# Patient Record
Sex: FEMALE | Race: WHITE | HISPANIC OR LATINO | Employment: FULL TIME | URBAN - METROPOLITAN AREA
[De-identification: names, ages, dates, MRNs, and addresses within clinical notes are randomized per-mention and may not be internally consistent; named-entity substitution may affect disease eponyms.]

---

## 2024-03-25 ENCOUNTER — APPOINTMENT (OUTPATIENT)
Dept: GENERAL RADIOLOGY | Facility: CLINIC | Age: 44
DRG: 390 | End: 2024-03-25
Attending: EMERGENCY MEDICINE

## 2024-03-25 ENCOUNTER — APPOINTMENT (OUTPATIENT)
Dept: CT IMAGING | Facility: CLINIC | Age: 44
DRG: 390 | End: 2024-03-25
Attending: EMERGENCY MEDICINE

## 2024-03-25 ENCOUNTER — HOSPITAL ENCOUNTER (INPATIENT)
Facility: CLINIC | Age: 44
LOS: 1 days | Discharge: HOME | DRG: 390 | End: 2024-03-26
Attending: EMERGENCY MEDICINE | Admitting: INTERNAL MEDICINE

## 2024-03-25 DIAGNOSIS — M02.30 POST-STREPTOCOCCAL REACTIVE ARTHRITIS (H): ICD-10-CM

## 2024-03-25 DIAGNOSIS — E11.65 TYPE 2 DIABETES MELLITUS WITH HYPERGLYCEMIA, WITHOUT LONG-TERM CURRENT USE OF INSULIN (H): Primary | ICD-10-CM

## 2024-03-25 DIAGNOSIS — K56.609 SMALL BOWEL OBSTRUCTION (H): ICD-10-CM

## 2024-03-25 DIAGNOSIS — B94.8 POST-STREPTOCOCCAL REACTIVE ARTHRITIS (H): ICD-10-CM

## 2024-03-25 LAB
ALBUMIN SERPL BCG-MCNC: 3.8 G/DL (ref 3.5–5.2)
ALP SERPL-CCNC: 85 U/L (ref 40–150)
ALT SERPL W P-5'-P-CCNC: 123 U/L (ref 0–50)
ANION GAP SERPL CALCULATED.3IONS-SCNC: 12 MMOL/L (ref 7–15)
AST SERPL W P-5'-P-CCNC: 48 U/L (ref 0–45)
ATRIAL RATE - MUSE: 72 BPM
BASE EXCESS BLDV CALC-SCNC: -2 MMOL/L (ref -3–3)
BASE EXCESS BLDV CALC-SCNC: 0 MMOL/L (ref -3–3)
BASOPHILS # BLD AUTO: 0.1 10E3/UL (ref 0–0.2)
BASOPHILS NFR BLD AUTO: 0 %
BILIRUB SERPL-MCNC: 0.3 MG/DL
BUN SERPL-MCNC: 15.1 MG/DL (ref 6–20)
CALCIUM SERPL-MCNC: 8.6 MG/DL (ref 8.6–10)
CHLORIDE SERPL-SCNC: 103 MMOL/L (ref 98–107)
CREAT SERPL-MCNC: 0.38 MG/DL (ref 0.51–0.95)
DEPRECATED HCO3 PLAS-SCNC: 24 MMOL/L (ref 22–29)
DIASTOLIC BLOOD PRESSURE - MUSE: NORMAL MMHG
EGFRCR SERPLBLD CKD-EPI 2021: >90 ML/MIN/1.73M2
EOSINOPHIL # BLD AUTO: 0.2 10E3/UL (ref 0–0.7)
EOSINOPHIL NFR BLD AUTO: 1 %
ERYTHROCYTE [DISTWIDTH] IN BLOOD BY AUTOMATED COUNT: 12 % (ref 10–15)
GLUCOSE BLDC GLUCOMTR-MCNC: 127 MG/DL (ref 70–99)
GLUCOSE BLDC GLUCOMTR-MCNC: 127 MG/DL (ref 70–99)
GLUCOSE BLDC GLUCOMTR-MCNC: 129 MG/DL (ref 70–99)
GLUCOSE BLDC GLUCOMTR-MCNC: 214 MG/DL (ref 70–99)
GLUCOSE SERPL-MCNC: 262 MG/DL (ref 70–99)
HBA1C MFR BLD: 10.8 %
HCG SERPL QL: NEGATIVE
HCO3 BLDV-SCNC: 25 MMOL/L (ref 21–28)
HCO3 BLDV-SCNC: 26 MMOL/L (ref 21–28)
HCT VFR BLD AUTO: 44.4 % (ref 35–47)
HGB BLD-MCNC: 15.5 G/DL (ref 11.7–15.7)
IMM GRANULOCYTES # BLD: 0 10E3/UL
IMM GRANULOCYTES NFR BLD: 0 %
INTERPRETATION ECG - MUSE: NORMAL
LACTATE BLD-SCNC: 2.3 MMOL/L
LACTATE BLD-SCNC: 2.9 MMOL/L
LACTATE SERPL-SCNC: 2 MMOL/L (ref 0.7–2)
LIPASE SERPL-CCNC: 44 U/L (ref 13–60)
LYMPHOCYTES # BLD AUTO: 3.1 10E3/UL (ref 0.8–5.3)
LYMPHOCYTES NFR BLD AUTO: 26 %
MCH RBC QN AUTO: 31.1 PG (ref 26.5–33)
MCHC RBC AUTO-ENTMCNC: 34.9 G/DL (ref 31.5–36.5)
MCV RBC AUTO: 89 FL (ref 78–100)
MONOCYTES # BLD AUTO: 0.5 10E3/UL (ref 0–1.3)
MONOCYTES NFR BLD AUTO: 4 %
NEUTROPHILS # BLD AUTO: 8.2 10E3/UL (ref 1.6–8.3)
NEUTROPHILS NFR BLD AUTO: 69 %
NRBC # BLD AUTO: 0 10E3/UL
NRBC BLD AUTO-RTO: 0 /100
P AXIS - MUSE: 35 DEGREES
PCO2 BLDV: 47 MM HG (ref 40–50)
PCO2 BLDV: 48 MM HG (ref 40–50)
PH BLDV: 7.33 [PH] (ref 7.32–7.43)
PH BLDV: 7.34 [PH] (ref 7.32–7.43)
PLATELET # BLD AUTO: 238 10E3/UL (ref 150–450)
PO2 BLDV: 35 MM HG (ref 25–47)
PO2 BLDV: 54 MM HG (ref 25–47)
POTASSIUM SERPL-SCNC: 3.5 MMOL/L (ref 3.4–5.3)
PR INTERVAL - MUSE: 130 MS
PROT SERPL-MCNC: 6.6 G/DL (ref 6.4–8.3)
QRS DURATION - MUSE: 80 MS
QT - MUSE: 356 MS
QTC - MUSE: 389 MS
R AXIS - MUSE: 68 DEGREES
RBC # BLD AUTO: 4.98 10E6/UL (ref 3.8–5.2)
SAO2 % BLDV: 63 % (ref 70–75)
SAO2 % BLDV: 85 % (ref 70–75)
SODIUM SERPL-SCNC: 139 MMOL/L (ref 135–145)
SYSTOLIC BLOOD PRESSURE - MUSE: NORMAL MMHG
T AXIS - MUSE: 58 DEGREES
VENTRICULAR RATE- MUSE: 72 BPM
WBC # BLD AUTO: 12.1 10E3/UL (ref 4–11)

## 2024-03-25 PROCEDURE — 85025 COMPLETE CBC W/AUTO DIFF WBC: CPT | Performed by: EMERGENCY MEDICINE

## 2024-03-25 PROCEDURE — 250N000012 HC RX MED GY IP 250 OP 636 PS 637: Performed by: INTERNAL MEDICINE

## 2024-03-25 PROCEDURE — 74177 CT ABD & PELVIS W/CONTRAST: CPT

## 2024-03-25 PROCEDURE — 250N000009 HC RX 250: Performed by: EMERGENCY MEDICINE

## 2024-03-25 PROCEDURE — 99223 1ST HOSP IP/OBS HIGH 75: CPT | Performed by: INTERNAL MEDICINE

## 2024-03-25 PROCEDURE — 258N000003 HC RX IP 258 OP 636: Performed by: INTERNAL MEDICINE

## 2024-03-25 PROCEDURE — 250N000011 HC RX IP 250 OP 636: Performed by: EMERGENCY MEDICINE

## 2024-03-25 PROCEDURE — 80053 COMPREHEN METABOLIC PANEL: CPT | Performed by: EMERGENCY MEDICINE

## 2024-03-25 PROCEDURE — 84703 CHORIONIC GONADOTROPIN ASSAY: CPT | Performed by: EMERGENCY MEDICINE

## 2024-03-25 PROCEDURE — 93005 ELECTROCARDIOGRAM TRACING: CPT

## 2024-03-25 PROCEDURE — 250N000013 HC RX MED GY IP 250 OP 250 PS 637: Performed by: INTERNAL MEDICINE

## 2024-03-25 PROCEDURE — 83036 HEMOGLOBIN GLYCOSYLATED A1C: CPT | Performed by: INTERNAL MEDICINE

## 2024-03-25 PROCEDURE — 83690 ASSAY OF LIPASE: CPT | Performed by: EMERGENCY MEDICINE

## 2024-03-25 PROCEDURE — 96375 TX/PRO/DX INJ NEW DRUG ADDON: CPT

## 2024-03-25 PROCEDURE — 99207 PR APP CREDIT; MD BILLING SHARED VISIT: CPT | Performed by: INTERNAL MEDICINE

## 2024-03-25 PROCEDURE — 250N000011 HC RX IP 250 OP 636: Performed by: INTERNAL MEDICINE

## 2024-03-25 PROCEDURE — 96361 HYDRATE IV INFUSION ADD-ON: CPT

## 2024-03-25 PROCEDURE — 87040 BLOOD CULTURE FOR BACTERIA: CPT | Performed by: EMERGENCY MEDICINE

## 2024-03-25 PROCEDURE — 99285 EMERGENCY DEPT VISIT HI MDM: CPT | Mod: 25

## 2024-03-25 PROCEDURE — 258N000003 HC RX IP 258 OP 636: Performed by: EMERGENCY MEDICINE

## 2024-03-25 PROCEDURE — 999N000065 XR ABDOMEN 1 VIEW

## 2024-03-25 PROCEDURE — 83605 ASSAY OF LACTIC ACID: CPT | Performed by: INTERNAL MEDICINE

## 2024-03-25 PROCEDURE — 96374 THER/PROPH/DIAG INJ IV PUSH: CPT | Mod: 59

## 2024-03-25 PROCEDURE — 82803 BLOOD GASES ANY COMBINATION: CPT

## 2024-03-25 PROCEDURE — 36415 COLL VENOUS BLD VENIPUNCTURE: CPT | Performed by: INTERNAL MEDICINE

## 2024-03-25 PROCEDURE — 36415 COLL VENOUS BLD VENIPUNCTURE: CPT | Performed by: EMERGENCY MEDICINE

## 2024-03-25 PROCEDURE — 120N000001 HC R&B MED SURG/OB

## 2024-03-25 PROCEDURE — 99221 1ST HOSP IP/OBS SF/LOW 40: CPT | Performed by: SPECIALIST

## 2024-03-25 RX ORDER — OXYCODONE HYDROCHLORIDE 5 MG/1
5 TABLET ORAL EVERY 4 HOURS PRN
Status: DISCONTINUED | OUTPATIENT
Start: 2024-03-25 | End: 2024-03-26 | Stop reason: HOSPADM

## 2024-03-25 RX ORDER — PIPERACILLIN SODIUM, TAZOBACTAM SODIUM 4; .5 G/20ML; G/20ML
4.5 INJECTION, POWDER, LYOPHILIZED, FOR SOLUTION INTRAVENOUS ONCE
Status: COMPLETED | OUTPATIENT
Start: 2024-03-25 | End: 2024-03-25

## 2024-03-25 RX ORDER — ONDANSETRON 4 MG/1
4 TABLET, ORALLY DISINTEGRATING ORAL EVERY 6 HOURS PRN
Status: DISCONTINUED | OUTPATIENT
Start: 2024-03-25 | End: 2024-03-26 | Stop reason: HOSPADM

## 2024-03-25 RX ORDER — HYDROMORPHONE HCL IN WATER/PF 6 MG/30 ML
0.4 PATIENT CONTROLLED ANALGESIA SYRINGE INTRAVENOUS
Status: DISCONTINUED | OUTPATIENT
Start: 2024-03-25 | End: 2024-03-25

## 2024-03-25 RX ORDER — HYDROMORPHONE HYDROCHLORIDE 1 MG/ML
0.3 INJECTION, SOLUTION INTRAMUSCULAR; INTRAVENOUS; SUBCUTANEOUS
Status: DISCONTINUED | OUTPATIENT
Start: 2024-03-25 | End: 2024-03-26 | Stop reason: HOSPADM

## 2024-03-25 RX ORDER — NALOXONE HYDROCHLORIDE 0.4 MG/ML
0.4 INJECTION, SOLUTION INTRAMUSCULAR; INTRAVENOUS; SUBCUTANEOUS
Status: DISCONTINUED | OUTPATIENT
Start: 2024-03-25 | End: 2024-03-26 | Stop reason: HOSPADM

## 2024-03-25 RX ORDER — PROCHLORPERAZINE 25 MG
25 SUPPOSITORY, RECTAL RECTAL EVERY 12 HOURS PRN
Status: DISCONTINUED | OUTPATIENT
Start: 2024-03-25 | End: 2024-03-26 | Stop reason: HOSPADM

## 2024-03-25 RX ORDER — DEXTROSE MONOHYDRATE 25 G/50ML
25-50 INJECTION, SOLUTION INTRAVENOUS
Status: DISCONTINUED | OUTPATIENT
Start: 2024-03-25 | End: 2024-03-26 | Stop reason: HOSPADM

## 2024-03-25 RX ORDER — LIDOCAINE 40 MG/G
CREAM TOPICAL
Status: DISCONTINUED | OUTPATIENT
Start: 2024-03-25 | End: 2024-03-26 | Stop reason: HOSPADM

## 2024-03-25 RX ORDER — IOPAMIDOL 755 MG/ML
85 INJECTION, SOLUTION INTRAVASCULAR ONCE
Status: COMPLETED | OUTPATIENT
Start: 2024-03-25 | End: 2024-03-25

## 2024-03-25 RX ORDER — HYDROMORPHONE HCL IN WATER/PF 6 MG/30 ML
0.2 PATIENT CONTROLLED ANALGESIA SYRINGE INTRAVENOUS
Status: DISCONTINUED | OUTPATIENT
Start: 2024-03-25 | End: 2024-03-25

## 2024-03-25 RX ORDER — PROCHLORPERAZINE MALEATE 10 MG
10 TABLET ORAL EVERY 6 HOURS PRN
Status: DISCONTINUED | OUTPATIENT
Start: 2024-03-25 | End: 2024-03-26 | Stop reason: HOSPADM

## 2024-03-25 RX ORDER — KETOROLAC TROMETHAMINE 15 MG/ML
10 INJECTION, SOLUTION INTRAMUSCULAR; INTRAVENOUS ONCE
Status: COMPLETED | OUTPATIENT
Start: 2024-03-25 | End: 2024-03-25

## 2024-03-25 RX ORDER — ACETAMINOPHEN 650 MG/1
650 SUPPOSITORY RECTAL EVERY 4 HOURS PRN
Status: DISCONTINUED | OUTPATIENT
Start: 2024-03-25 | End: 2024-03-26 | Stop reason: HOSPADM

## 2024-03-25 RX ORDER — LANCETS
EACH MISCELLANEOUS
Qty: 100 EACH | Refills: 6 | Status: SHIPPED | OUTPATIENT
Start: 2024-03-25 | End: 2024-03-25

## 2024-03-25 RX ORDER — HYDRALAZINE HYDROCHLORIDE 10 MG/1
10 TABLET, FILM COATED ORAL EVERY 4 HOURS PRN
Status: DISCONTINUED | OUTPATIENT
Start: 2024-03-25 | End: 2024-03-26 | Stop reason: HOSPADM

## 2024-03-25 RX ORDER — ACETAMINOPHEN 325 MG/1
650 TABLET ORAL EVERY 4 HOURS PRN
Status: DISCONTINUED | OUTPATIENT
Start: 2024-03-25 | End: 2024-03-26 | Stop reason: HOSPADM

## 2024-03-25 RX ORDER — NICOTINE POLACRILEX 4 MG
15-30 LOZENGE BUCCAL
Status: DISCONTINUED | OUTPATIENT
Start: 2024-03-25 | End: 2024-03-26 | Stop reason: HOSPADM

## 2024-03-25 RX ORDER — NALOXONE HYDROCHLORIDE 0.4 MG/ML
0.2 INJECTION, SOLUTION INTRAMUSCULAR; INTRAVENOUS; SUBCUTANEOUS
Status: DISCONTINUED | OUTPATIENT
Start: 2024-03-25 | End: 2024-03-26 | Stop reason: HOSPADM

## 2024-03-25 RX ORDER — ONDANSETRON 2 MG/ML
4 INJECTION INTRAMUSCULAR; INTRAVENOUS EVERY 30 MIN PRN
Status: DISCONTINUED | OUTPATIENT
Start: 2024-03-25 | End: 2024-03-25

## 2024-03-25 RX ORDER — ONDANSETRON 2 MG/ML
4 INJECTION INTRAMUSCULAR; INTRAVENOUS EVERY 6 HOURS PRN
Status: DISCONTINUED | OUTPATIENT
Start: 2024-03-25 | End: 2024-03-26 | Stop reason: HOSPADM

## 2024-03-25 RX ORDER — HYDRALAZINE HYDROCHLORIDE 20 MG/ML
10 INJECTION INTRAMUSCULAR; INTRAVENOUS EVERY 4 HOURS PRN
Status: DISCONTINUED | OUTPATIENT
Start: 2024-03-25 | End: 2024-03-26 | Stop reason: HOSPADM

## 2024-03-25 RX ORDER — LIDOCAINE HYDROCHLORIDE 20 MG/ML
10 JELLY TOPICAL ONCE
Status: COMPLETED | OUTPATIENT
Start: 2024-03-25 | End: 2024-03-25

## 2024-03-25 RX ORDER — SODIUM CHLORIDE, SODIUM LACTATE, POTASSIUM CHLORIDE, CALCIUM CHLORIDE 600; 310; 30; 20 MG/100ML; MG/100ML; MG/100ML; MG/100ML
INJECTION, SOLUTION INTRAVENOUS CONTINUOUS
Status: DISCONTINUED | OUTPATIENT
Start: 2024-03-25 | End: 2024-03-25

## 2024-03-25 RX ORDER — MORPHINE SULFATE 4 MG/ML
4 INJECTION, SOLUTION INTRAMUSCULAR; INTRAVENOUS
Status: COMPLETED | OUTPATIENT
Start: 2024-03-25 | End: 2024-03-25

## 2024-03-25 RX ADMIN — INSULIN GLARGINE 10 UNITS: 100 INJECTION, SOLUTION SUBCUTANEOUS at 09:17

## 2024-03-25 RX ADMIN — Medication 1 ML: at 06:20

## 2024-03-25 RX ADMIN — SODIUM CHLORIDE, POTASSIUM CHLORIDE, SODIUM LACTATE AND CALCIUM CHLORIDE 1300 ML: 600; 310; 30; 20 INJECTION, SOLUTION INTRAVENOUS at 04:30

## 2024-03-25 RX ADMIN — KETOROLAC TROMETHAMINE 10 MG: 15 INJECTION, SOLUTION INTRAMUSCULAR; INTRAVENOUS at 01:51

## 2024-03-25 RX ADMIN — SODIUM CHLORIDE, POTASSIUM CHLORIDE, SODIUM LACTATE AND CALCIUM CHLORIDE: 600; 310; 30; 20 INJECTION, SOLUTION INTRAVENOUS at 06:54

## 2024-03-25 RX ADMIN — INSULIN ASPART 2 UNITS: 100 INJECTION, SOLUTION INTRAVENOUS; SUBCUTANEOUS at 09:17

## 2024-03-25 RX ADMIN — IOPAMIDOL 85 ML: 755 INJECTION, SOLUTION INTRAVENOUS at 03:16

## 2024-03-25 RX ADMIN — POTASSIUM CHLORIDE: 149 INJECTION, SOLUTION, CONCENTRATE INTRAVENOUS at 09:19

## 2024-03-25 RX ADMIN — POTASSIUM CHLORIDE: 149 INJECTION, SOLUTION, CONCENTRATE INTRAVENOUS at 18:25

## 2024-03-25 RX ADMIN — PIPERACILLIN AND TAZOBACTAM 4.5 G: 4; .5 INJECTION, POWDER, FOR SOLUTION INTRAVENOUS at 04:30

## 2024-03-25 RX ADMIN — SODIUM CHLORIDE 1000 ML: 9 INJECTION, SOLUTION INTRAVENOUS at 01:52

## 2024-03-25 RX ADMIN — MORPHINE SULFATE 4 MG: 4 INJECTION, SOLUTION INTRAMUSCULAR; INTRAVENOUS at 01:48

## 2024-03-25 RX ADMIN — SODIUM CHLORIDE 64 ML: 9 INJECTION, SOLUTION INTRAVENOUS at 03:16

## 2024-03-25 RX ADMIN — ONDANSETRON 4 MG: 2 INJECTION INTRAMUSCULAR; INTRAVENOUS at 01:52

## 2024-03-25 RX ADMIN — LIDOCAINE HYDROCHLORIDE 10 ML: 20 JELLY TOPICAL at 05:21

## 2024-03-25 RX ADMIN — TOPICAL ANESTHETIC 0.5 ML: 200 SPRAY DENTAL; PERIODONTAL at 05:21

## 2024-03-25 ASSESSMENT — ACTIVITIES OF DAILY LIVING (ADL)
ADLS_ACUITY_SCORE: 38
ADLS_ACUITY_SCORE: 38
ADLS_ACUITY_SCORE: 20
ADLS_ACUITY_SCORE: 20
ADLS_ACUITY_SCORE: 21
ADLS_ACUITY_SCORE: 35
ADLS_ACUITY_SCORE: 20
ADLS_ACUITY_SCORE: 35
ADLS_ACUITY_SCORE: 20
ADLS_ACUITY_SCORE: 35
ADLS_ACUITY_SCORE: 38
ADLS_ACUITY_SCORE: 38
ADLS_ACUITY_SCORE: 20
ADLS_ACUITY_SCORE: 35
ADLS_ACUITY_SCORE: 20
ADLS_ACUITY_SCORE: 20
ADLS_ACUITY_SCORE: 38
ADLS_ACUITY_SCORE: 35
ADLS_ACUITY_SCORE: 21
ADLS_ACUITY_SCORE: 21

## 2024-03-25 NOTE — DISCHARGE INSTRUCTIONS
"    Go Here for Primary Care - low cost / sliding scale options :)   [facility]  - \"[facility]\" is here for you!  *they will help keep costs low even if you do not have insurance; call to make appointment!     Address: [address] [place] [address]  Contact Us: [email or web address]      Main: [phone]  Dental: [phone]  [phone]  Fax: [phone]     Night and Weekend Nurse Line: [phone]   Pregnancy Call Line: [phone]              Open Monday - Friday, 8 a.m. to 5 p.m. (in-person, phone and video care available).  [facility] is a community clinic that welcomes patients of every age, race, color, ethnicity and language and has served the community for over 50 years.  They offer financial assistance and discount programs for uninsured persons.  [facility] can help you find resources for food, shelter, health care and prescription refills. COVID-19 testing is available with a pre-screening phone call.  [facility] provides medical, dental and mental health care, legal services, advocacy for domestic abuse and sexual assault, and much more--all in one place.    We speak your language! Language assistance services are available free of charge ensuring you receive the highest quality of care. Call [phone] TTY [phone].  Español - ATENCIÓN: si habla español, tiene a hoyos disposición servicios gratuitos de asistencia lingüística. Layla garrett [phone] TTY [phone].  Soomaali - FIIRO GAAR AH: Hadtamia holman Mongolian, adeegyada dayanna vasques. [facility] [phone] TTY [phone].  Hmoomelly ROBERT CEEV:  Sundeep Rodas, renata jose lindy robert, muamarcela jose lindy wilson. [facility] [phone] TTY [phone].  Ti?ng Vi?t - GENEVIEVE Ý:  N?u b?n nói Ti?ng Vi?t, có các d?ch v? h? tr? ngôn ng? mi?n phí dành cho b?n. G?i s? [phone] TTY [phone].  Indonesian Language - ??????: ?????? ????????????? " ???, ??????????????????????? ???? ??? ???, ?????????????, ?????????????????. ?? ?? ??? ???  [phone]

## 2024-03-25 NOTE — PROGRESS NOTES
RECEIVING UNIT ED HANDOFF REVIEW    ED Nurse Handoff Report was reviewed by: Nathalia Santana RN on March 25, 2024 at 5:55 AM

## 2024-03-25 NOTE — PHARMACY-ADMISSION MEDICATION HISTORY
Pharmacist Admission Medication History    Admission medication history is complete. The information provided in this note is only as accurate as the sources available at the time of the update.    Information Source(s): Patient via in-person        Changes made to PTA medication list:  Added: None  Deleted: naproxen, penicillin (from 2011)  Changed: updated ibuprofen entry    Allergies reviewed with patient and updates made in EHR: yes    Medication History Completed By: Mickie Diana, PharmD 3/25/2024 7:44 AM    PTA Med List   Medication Sig Last Dose    Ibuprofen (ADVIL) 200 MG capsule Take 800 mg by mouth every 8 hours as needed for moderate pain 3/24/2024

## 2024-03-25 NOTE — CONSULTS
Consultation - Surgery  Jessica Villa,  1980, MRN [id number]    Admitting Dx: Small bowel obstruction (H) [K56.609]    PCP: No Ref-Primary, Physician, None   Code status:  Full Code       Extended Emergency Contact Information  Primary Emergency Contact: AVTAR NUÑEZ  Address: [address]            [place], [place] [address] [place] [address]  Home Phone: [phone]  Relation: Ex-Spouse  Secondary Emergency Contact: Liu Marin  Mobile Phone: [phone]  Relation: Significant other       Assessment and Plan   Impression:  Small bowel obstruction versus severe gastroenteritis.  The mesenteric edema and her slightly elevated lactic acid is a bit worrisome, however, she is feeling better, having diarrhea and her abdominal exam is unremarkable.      Plan:  Will repeat a plain x-ray to see if it is improving.  If it is not we will then consider a Gastrografin small bowel follow-through.           Chief Complaint Small bowel obstruction (H)       HPI    We have been requested by the hospitalist service to to evaluate Jessica Villa for a small bowel obstruction.  This is a 43-year-old woman who presented yesterday with an acute onset of abdominal pain.  CT scan shows a small bowel obstruction with some significant mesenteric edema.  She states that last night she started having some.  She states that she is feeling better this morning.  She denies any previous abdominal surgeries.       Medical History  Patient Active Problem List   Diagnosis    Post-streptococcal reactive arthritis (H)    Small bowel obstruction (H)       [unfilled] Surgical History  No past surgical history on file.     Social History  Social History     Tobacco Use    Smoking status: Never    Smokeless tobacco: Never   Substance Use Topics    Alcohol use: No    Drug use: No       Allergies  No Known Allergies Family History  Reviewed, and family history is not on file.  The Family history is not pertinent to the patients  chief complaint. Psychosocial Needs  Social History     Social History Narrative    Not on file     Additional psychosocial needs reviewed per nursing assessment.     Prior to Admission Medications   Current Outpatient Medications   Medication Instructions    ibuprofen (ADVIL) 800 mg, Oral, EVERY 8 HOURS PRN           Review of Systems:  Pertinent items are noted in HPI. Physical Exam:  Temp:  [96.8  F (36  C)-98.4  F (36.9  C)] 97.9  F (36.6  C)  Pulse:  [] 72  Resp:  [18-20] 18  BP: (107-164)/(60-89) 126/84  SpO2:  [96 %-100 %] 98 %    General appearance: alert, appears stated age, cooperative, and mildly obese  Eyes: no abnormalities detected  Lungs: Breathing comfortably.  No shortness of breath  Heart: regular rate and rhythm  Abdomen: soft, non-tender; bowel sounds normal; no masses,  no organomegaly  Extremities: extremities, peripheral pulses and reflexes normal  Pulses: 2+ and symmetric  Skin: Skin color, texture, turgor normal. No rashes or lesions  Neurologic: Grossly normal       Pertinent Labs  Lab Results: personally reviewed.   Lab Results   Component Value Date    WBC 12.1 03/25/2024    WBC 6.2 10/04/2011    HGB 15.5 03/25/2024    HGB 11.8 10/04/2011    HCT 44.4 03/25/2024    HCT 34.5 10/04/2011    MCV 89 03/25/2024    MCV 89 10/04/2011     03/25/2024     10/04/2011        Pertinent Radiology  Radiology Results: Personally reviewed image/s and Personally reviewed impression/s  EKG Results: not reviewed.

## 2024-03-25 NOTE — LETTER
[facility] SURGERY  [address] [place] [facility] [address]  Phone: [phone]  Fax: [phone]    March 26, 2024        Jessica Villa  [address] [address]          To whom it may concern:    RE: Jessica Villa    Patient was admitted from 3/25/2024 until 03/26/24. Patient may return to work 3/28 without restrictions.    Please contact me for questions or concerns.      Sincerely,      Christy Irby MD

## 2024-03-25 NOTE — PROGRESS NOTES
[facility]    Hospitalist Progress Note    Date of Service (when I saw the patient): 03/25/2024  Admit date: 3/25/2024    Interval History   Full details of events over last 24 hours outlined below.   Admitted by my partner this AM.   Seen by surgery this AM.   Having loose BM's, passing gas, no further nausea, abdomen soft > surgery discontinued NG and advanced to clear liquids  Denies any SOB, CP, N/V.   Notes some mild ongoing lower abdominal pain.    Assessment & Plan   Jessica Villa is a 43 year old female with no significant past medical history who presents with abdominal pain, n/v/d. Admitted on 3/25/2024.      Small bowel obstruction  Elevated lactic acid, without acidosis, secondary to above, RESOLVED  Leukocytosis, secondary to above  *Presented with diarrhea initially, followed by lower abdominal pain, nausea and vomiting.   *CT abd/pelvis with mechanical mid small bowel obstruction and lower mesenteric edema, lactic acid 2.3, WBC 12.1.   *No prior hx of abdominal surgeries  *Case discussed with on call surgeon in ED, recommended NG tube and will evaluate patient  *Abdominal exam non-surgical at this time.     General surgery consult appreciated.   NG removed by surgery, advanced to clear liquids.   K 3.5. Add K to IVF, change from LR to LR + 20 KCl @ 125 ml/h when current bag complete. > decreased to 75 ml/h. Stop when tolerating adequate PO fluids.    Monitor and replace electrolytes  Ordered oxycodone with hydromorphone for breakthrough if needed but explained to patient and RN to only use this for severe pain, in which case she should back off PO intake.   ondansetron, prochlorperazine PRN  Ordered lactic acid recheck > normalized.     Recent Labs   Lab 03/25/24  0818 03/25/24  0546 03/25/24  0414 03/25/24  0146   WBC  --   --   --  12.1*   LACT 2.0 2.9* 2.3*  --        Hepatic steatosis, obesity   *AST 48, . CT with hepatic steatosis.  *Denies alcohol  use.    Recent Labs   Lab 03/25/24  0236 03/25/24  0146   ALBUMIN 3.8  --    BILITOTAL 0.3  --    *  --    AST 48*  --    ALKPHOS 85  --    LIPASE  --  44   CR 0.38*  --           Hyperglycemia, DMT2 A1c 10.8   Glucose 262 on CMP. No known hx of diabetes  Start lantus 10 units q AM on 03/25/24   NPO, Medium sliding scale insulin q 4 hours  Hypoglycemia protocol  Discussed with patient, aware of diagnosis. On no medications. Has glucometer at home.   Nutrition consult  Will prescribe metformin at discharge, but will be lorne to go slow given recent SBO.      Recent Labs   Lab 03/25/24  0236   *           Clinically Significant Risk Factors Present on Admission                      # DMII: A1C = 10.8 % (Ref range: <5.7 %) within past 6 months             Diet: Orders Placed This Encounter      NPO for Medical/Clinical Reasons Except for: Ice Chips     IVF: LR + 20 KCl @ 75 ml/h > stop when taking adequate PO  DVT Prophylaxis: AMBULATE, PCDs  Ibrahim Catheter: Not present    Full Code  Disposition:  Anticipate discharge, hopefully 3/26/24   Communication: Discussed with patient and RN, family at bedside ( and doctor) on 03/25/24  Discussed everything in Gambian, answered all questions. Patient and family declined .     Christy Irby MD    Hospitalist Service  [facility]  Securely message with the Vocera Web Console (learn more here)  Text page via [employer] Paging/Directory    Medical Decision Making       Spent total of 15 MINUTES SPENT BY ME on the date of service doing chart review, history, exam, documentation & further activities per the note.      -Data reviewed today: I reviewed all new labs and imaging results over the last 24 hours. I personally reviewed no images or EKG's today.    Physical Exam   Temp: 96.8  F (36  C) Temp src: Oral BP: 138/83 Pulse: 93   Resp: 20 SpO2: 98 % O2 Device: None (Room air)    Vitals:    03/25/24 0124   Weight: 77.1 kg (170 lb)      Vital Signs with Ranges  Temp:  [96.8  F (36  C)-98.4  F (36.9  C)] 96.8  F (36  C)  Pulse:  [] 93  Resp:  [18-20] 20  BP: (107-164)/(60-89) 138/83  SpO2:  [96 %-100 %] 98 %  I/O last 3 completed shifts:  In: -   Out: 400 [Urine:400]    Today's Exam  Constitutional:  NAD,   Neuropsyche:  alert and oriented, answers questions appropriately.   Respiratory:  Breathing comfortably, good air exchange, no wheezes, no crackles.   Cardiovascular:  Regular rate and rhythm, no edema.  GI:  soft, minimal TTP in low abdomen/ND, BS active  Skin/Integumen:  No acute rash or sign of bleeding.     Medications   All medications reviewed on 03/25/24       lactated ringers 125 mL/hr at 03/25/24 0654      insulin aspart  1-7 Units Subcutaneous Q4H    sodium chloride (PF)  3 mL Intracatheter Q8H     PRN Meds: acetaminophen **OR** acetaminophen, benzocaine 20%, glucose **OR** dextrose **OR** glucagon, hydrALAZINE **OR** hydrALAZINE, HYDROmorphone, HYDROmorphone, lidocaine 4%, lidocaine (buffered or not buffered), naloxone **OR** naloxone **OR** naloxone **OR** naloxone, ondansetron **OR** ondansetron, prochlorperazine **OR** prochlorperazine **OR** prochlorperazine, sodium chloride (PF)    Data   Recent Labs   Lab 03/25/24  0236 03/25/24  0146   WBC  --  12.1*   HGB  --  15.5   MCV  --  89   PLT  --  238     --    POTASSIUM 3.5  --    CHLORIDE 103  --    CO2 24  --    BUN 15.1  --    CR 0.38*  --    ANIONGAP 12  --    LINETTE 8.6  --    *  --    ALBUMIN 3.8  --    PROTTOTAL 6.6  --    BILITOTAL 0.3  --    ALKPHOS 85  --    *  --    AST 48*  --    LIPASE  --  44       Recent Results (from the past 24 hour(s))   CT Abdomen Pelvis w Contrast    Narrative    EXAM: CT ABDOMEN PELVIS W CONTRAST  LOCATION: [facility]  DATE: 3/25/2024    INDICATION: periumbilical pain, vomiting  COMPARISON: None.  TECHNIQUE: CT scan of the abdomen and pelvis was performed following injection of IV contrast.  Multiplanar reformats were obtained. Dose reduction techniques were used.  CONTRAST: 85mL Isovue 370    FINDINGS:   LOWER CHEST: Normal.    HEPATOBILIARY: Diffuse hepatic steatosis. Normal gallbladder.    PANCREAS: Normal.    SPLEEN: Normal.    ADRENAL GLANDS: Normal.    KIDNEYS/BLADDER: Normal kidneys. The bladder is empty.    BOWEL: Multiple loops of fluid-filled and mildly distended mid small bowel with areas of fecalization of internal content in the central abdomen. Multiple bowel caliber narrowings in the deep lower abdomen as seen on axial image 138 and coronal image 44.   Lower mesenteric edema. No free air. Normal appendix.    LYMPH NODES: Normal.    VASCULATURE: Normal.    PELVIC ORGANS: Free fluid. No mass.    MUSCULOSKELETAL: Normal.      Impression    IMPRESSION:   1.  Mechanical mid small bowel obstruction. No free air.    2.  Hepatic steatosis.   XR Abdomen 1 View    Narrative    EXAM: XR ABDOMEN 1 VIEW  LOCATION: [facility]  DATE: 3/25/2024    INDICATION: NG tube placement  COMPARISON: CT 03/25/2024      Impression    IMPRESSION: NG tube tip in the stomach.

## 2024-03-25 NOTE — ED TRIAGE NOTES
Pt presents with upper abd pain that began today.      Triage Assessment (Adult)       Row Name 03/25/24 0120          Triage Assessment    Airway WDL WDL        Respiratory WDL    Respiratory WDL WDL        Skin Circulation/Temperature WDL    Skin Circulation/Temperature WDL WDL        Cardiac WDL    Cardiac WDL WDL        Peripheral/Neurovascular WDL    Peripheral Neurovascular WDL WDL        Cognitive/Neuro/Behavioral WDL    Cognitive/Neuro/Behavioral WDL WDL

## 2024-03-25 NOTE — H&P
[facility]    History and Physical - Hospitalist Service       Date of Admission:  3/25/2024    Assessment & Plan   Jessica Villa is a 43 year old female with no significant past medical history who presents with abdominal pain, n/v/d. Admitted on 3/25/2024.     Small bowel obstruction  *Presented with diarrhea initially, followed by lower abdominal pain, nausea and vomiting.   *CT abd/pelvis with mechanical mid small bowel obstruction and lower mesenteric edema, lactic acid 2.3, WBC 12.1.   *No prior hx of abdominal surgeries  *Case discussed with on call surgeon in ED, recommended NG tube and will evaluate patient  *Abdominal exam non-surgical at this time.   - NPO  - NG to LIS  - IV fluids  - Monitor and replace electrolytes  - General surgery consulted for co-management   - IV hydromorphone, ondansetron, prochlorperazine PRN  - Trend lactic acid     Hepatic steatosis   *AST 48, . CT with hepatic steatosis.  *Denies alcohol use.  - Monitor CMP    Hyperglycemia  Glucose 262 on CMP. No known hx of diabetes  - HgbA1c   - Medium sliding scale per NPO protocol  - Hypoglycemia protocol          Diet: NPO for Medical/Clinical Reasons Except for: Meds    DVT Prophylaxis: Pneumatic Compression Devices  Ibrahim Catheter: Not present  Code Status:  Full code     Disposition Plan   Admit to inpatient. Anticipate greater than or equal to 2 midnights prior to discharge.      Entered: Khai Jarquin MD 03/25/2024, 5:55 AM     The patient's care was discussed with the ED provider, patient and patient's      Medical Decision Making       60 MINUTES SPENT BY ME on the date of service doing chart review, history, exam, documentation & further activities per the note.      Clinically Significant Risk Factors Present on Admission                                       Khai Jarquin MD  [facility]  Hospital    ______________________________________________________________________    Chief Complaint   Abdominal pain, n/v/d    History of Present Illness   Jessica Villa is a 43 year old female who presents with the above chief complaint.    History is obtained from the patient, discussion with ED provider and review of medical record.  The patient reports around 6 PM on 3/24/24 she developed initially diarrhea, followed by nausea with a sour taste in her mouth and lower central abdominal pain. She reports an episode of vomiting on the way to the ED and three vomiting episodes total. She has not passed gas since her time in the ED. She denies any prior history of abdominal surgeries. She denies any chronic medical problems or medications.     In the Emergency Department, the patient was seen by Dr. Sanchez, with whom I discussed the patient's presenting symptoms and emergency department course.  Initial vital signs were a temperature of 98.4F, HR 73, /89, RR 18, SpO2 98% on room air. Pertinent work-up as noted in A&P. The patient received IVF, piperacillin-tazobactam IV, IV morphine, IV ketorolac and Hospitalists were contacted for admission to the hospital.     Past Medical History    I have reviewed this patient's medical history and updated it with pertinent information if needed.   Past Medical History:   Diagnosis Date    Gestational diabetes     Post-streptococcal reactive arthritis (H)        Past Surgical History   I have reviewed this patient's surgical history and updated it with pertinent information if needed.  No past surgical history on file.      Social History   I have reviewed this patient's social history and updated it with pertinent information if needed.  Social History     Tobacco Use    Smoking status: Never    Smokeless tobacco: Never   Substance Use Topics    Alcohol use: No    Drug use: No           Prior to Admission Medications    Denies taking any medications.    Allergies   No  Known Allergies    Physical Exam   Vital Signs: Temp: 98.4  F (36.9  C) Temp src: Oral BP: 139/85 Pulse: 93   Resp: 18 SpO2: 99 % O2 Device: None (Room air)    Weight: 170 lbs 0 oz    Constitutional: NAD  Respiratory: Clear to auscultation bilaterally, good air movement, normal effort   Cardiovascular: RRR, no m/r/g. No peripheral edema. 2   GI: Soft, mild lower abdominal tenderness, non-distended. No rebound tenderness or guarding.   Skin: Warm, dry   Neurologic: Alert. Responding to questions appropriately. Following commands.    Psychiatric: Normal affect, appropriate      Data   Data reviewed today: I reviewed all medications, new labs and imaging results over the last 24 hours. I personally reviewed the EKG tracing showing sinus rhythm .    Recent Labs   Lab 03/25/24  0236 03/25/24  0146   WBC  --  12.1*   HGB  --  15.5   MCV  --  89   PLT  --  238     --    POTASSIUM 3.5  --    CHLORIDE 103  --    CO2 24  --    BUN 15.1  --    CR 0.38*  --    ANIONGAP 12  --    LINETTE 8.6  --    *  --    ALBUMIN 3.8  --    PROTTOTAL 6.6  --    BILITOTAL 0.3  --    ALKPHOS 85  --    *  --    AST 48*  --    LIPASE  --  44       Recent Results (from the past 24 hour(s))   CT Abdomen Pelvis w Contrast    Narrative    EXAM: CT ABDOMEN PELVIS W CONTRAST  LOCATION: [facility]  DATE: 3/25/2024    INDICATION: periumbilical pain, vomiting  COMPARISON: None.  TECHNIQUE: CT scan of the abdomen and pelvis was performed following injection of IV contrast. Multiplanar reformats were obtained. Dose reduction techniques were used.  CONTRAST: 85mL Isovue 370    FINDINGS:   LOWER CHEST: Normal.    HEPATOBILIARY: Diffuse hepatic steatosis. Normal gallbladder.    PANCREAS: Normal.    SPLEEN: Normal.    ADRENAL GLANDS: Normal.    KIDNEYS/BLADDER: Normal kidneys. The bladder is empty.    BOWEL: Multiple loops of fluid-filled and mildly distended mid small bowel with areas of fecalization of internal content  in the central abdomen. Multiple bowel caliber narrowings in the deep lower abdomen as seen on axial image 138 and coronal image 44.   Lower mesenteric edema. No free air. Normal appendix.    LYMPH NODES: Normal.    VASCULATURE: Normal.    PELVIC ORGANS: Free fluid. No mass.    MUSCULOSKELETAL: Normal.      Impression    IMPRESSION:   1.  Mechanical mid small bowel obstruction. No free air.    2.  Hepatic steatosis.

## 2024-03-25 NOTE — ED PROVIDER NOTES
History     Chief Complaint:  Abdominal Pain       HPI   Jessica Mahan is a 43 year old female who presents to the ED with her daughter for evaluation of abdominal pain. Per daughter, Jessica has had abdominal pain localized to the umbilical region that began today. She took ibuprofen with no relief. Endorses vomiting. Denies abdominal surgeries or sick contacts.      Independent Historian:   Daughter - They report above    Review of External Notes:   Reviewed office visit note to rheumatology for poststreptococcal polyarticular reactive arthritis.    Medications:    Narpoxen  Veetid    Past Medical History:    Reactive arthritis    Past Surgical History:    No pertinent surgical history.     Physical Exam   Patient Vitals for the past 24 hrs:   BP Temp Temp src Pulse Resp SpO2 Weight   03/25/24 0430 113/70 -- -- 72 -- 99 % --   03/25/24 0301 -- -- -- -- -- 96 % --   03/25/24 0231 107/60 -- -- -- -- 99 % --   03/25/24 0218 -- -- -- -- -- 98 % --   03/25/24 0158 135/81 -- -- -- -- 97 % --   03/25/24 0128 (!) 164/89 -- -- -- -- 99 % --   03/25/24 0124 (!) 164/89 98.4  F (36.9  C) Oral 72 18 98 % 77.1 kg (170 lb)        Physical Exam  General: Well-nourished, clutching emesis bag and appears to be in severe pain  Eyes: PERRL, conjunctivae pink no scleral icterus or conjunctival injection  ENT:  Moist mucus membranes, posterior oropharynx clear without erythema or exudates  Respiratory:  Lungs clear to auscultation bilaterally, no crackles/rubs/wheezes.  Good air movement  CV: Normal rate and rhythm, no murmurs/rubs/gallops  GI:  Abdomen soft and non-distended.  Hypoactive BS.  Severe tenderness, primarily over the umbilical area.  No umbilical hernias palpated.  Skin: Warm, dry.  No rashes or petechiae  Musculoskeletal: No peripheral edema or calf tenderness  Neuro: Alert and oriented to person/place/time  Psychiatric: Normal affect      Emergency Department Course   ECG  ECG results from 03/25/24   EKG  12-lead, tracing only     Value    Systolic Blood Pressure     Diastolic Blood Pressure     Ventricular Rate 72    Atrial Rate 72    AL Interval 130    QRS Duration 80        QTc 389    P Axis 35    R AXIS 68    T Axis 58    Interpretation ECG      Sinus rhythm  Normal ECG  When compared with ECG of 28-FEB-2011 17:58,  No significant change was found  Taken at 0134. Read at 0140 by Marsha Sanchez MD.         Imaging:  CT Abdomen Pelvis w Contrast   Final Result   IMPRESSION:    1.  Mechanical mid small bowel obstruction. No free air.      2.  Hepatic steatosis.             Laboratory:  Labs Ordered and Resulted from Time of ED Arrival to Time of ED Departure   COMPREHENSIVE METABOLIC PANEL - Abnormal       Result Value    Sodium 139      Potassium 3.5      Carbon Dioxide (CO2) 24      Anion Gap 12      Urea Nitrogen 15.1      Creatinine 0.38 (*)     GFR Estimate >90      Calcium 8.6      Chloride 103      Glucose 262 (*)     Alkaline Phosphatase 85      AST 48 (*)      (*)     Protein Total 6.6      Albumin 3.8      Bilirubin Total 0.3     CBC WITH PLATELETS AND DIFFERENTIAL - Abnormal    WBC Count 12.1 (*)     RBC Count 4.98      Hemoglobin 15.5      Hematocrit 44.4      MCV 89      MCH 31.1      MCHC 34.9      RDW 12.0      Platelet Count 238      % Neutrophils 69      % Lymphocytes 26      % Monocytes 4      % Eosinophils 1      % Basophils 0      % Immature Granulocytes 0      NRBCs per 100 WBC 0      Absolute Neutrophils 8.2      Absolute Lymphocytes 3.1      Absolute Monocytes 0.5      Absolute Eosinophils 0.2      Absolute Basophils 0.1      Absolute Immature Granulocytes 0.0      Absolute NRBCs 0.0     ISTAT GASES LACTATE VENOUS POCT - Abnormal    Lactic Acid POCT 2.3 (*)     Bicarbonate Venous POCT 25      O2 Sat, Venous POCT 85 (*)     pCO2 Venous POCT 47      pH Venous POCT 7.33      pO2 Venous POCT 54 (*)     Base Excess/Deficit (+/-) POCT -2.0     LIPASE - Normal    Lipase 44     HCG  QUALITATIVE PREGNANCY - Normal    hCG Serum Qualitative Negative     BLOOD CULTURE   BLOOD CULTURE        Procedures   None     Emergency Department Course & Assessments:    Interventions:  Medications   ondansetron (ZOFRAN) injection 4 mg (4 mg Intravenous $Given 3/25/24 0152)   lactated ringers BOLUS 1,300 mL (1,300 mLs Intravenous $New Bag 3/25/24 0430)   lactated ringers infusion (has no administration in time range)   benzocaine 20% (HURRICAINE/TOPEX) 20 % spray 0.5-1 mL (has no administration in time range)   sodium chloride 0.9% BOLUS 1,000 mL (0 mLs Intravenous Stopped 3/25/24 0310)   ketorolac (TORADOL) injection 10 mg (10 mg Intravenous $Given 3/25/24 0151)   morphine (PF) injection 4 mg (4 mg Intravenous $Given 3/25/24 0148)   iopamidol (ISOVUE-370) solution 85 mL (85 mLs Intravenous $Given 3/25/24 0316)   sodium chloride 0.9 % CT scan flush use (64 mLs Intravenous $Given 3/25/24 0316)   piperacillin-tazobactam (ZOSYN) 4.5 g vial to attach to  mL bag (4.5 g Intravenous $New Bag 3/25/24 0430)        Assessments:  0129 I obtained patient history and performed a physical exam.     Independent Interpretation (X-rays, CTs, rhythm strip):  None    Consultations/Discussion of Management or Tests:  0410  I spoke with Dr. Ken Davila of general surgery.  Discussed laboratory findings.  She reviewed imaging and did recommend that we place an NG.  0437 I spoke with Dr. Jarquin, hospitalist, regarding the patient's history and presentation in the emergency department today.          Social Determinants of Health affecting care:   None    Disposition:  The patient was admitted to the hospital under the care of Dr. Jarquin.     Impression & Plan    CMS Diagnoses:  If one the following conditions is present, a 30 mL/kg bolus is recommended as part of the 6 hour bundle (IBW can be used for BMI >30, or document refusal/contraindication):      1.   Initial hypotension  defined as 2 bps < 90 or map < 65 in the 6hrs  "before or 3hrs after time zero.     2.  Lactate >4.      The patient has signs of Severe Sepsis as evidenced by:    1. 2 SIRS criteria, AND  2. Suspected infection, AND   3. Organ dysfunction: Lactic Acidosis with value >2.0    Time severe sepsis diagnosis confirmed: 0421 03/25/24 as this was the time when Lactate resulted, and the level was > 2.0    3 Hour Severe Sepsis Bundle Completion:    1. Initial Lactic Acid Result:   Recent Labs   Lab Test 03/25/24 0414   LACT 2.3*     2. Blood Cultures before Antibiotics: Yes  3. Broad Spectrum Antibiotics Administered:  yes       Anti-infectives (From admission through now)      Start     Dose/Rate Route Frequency Ordered Stop    03/25/24 0420  piperacillin-tazobactam (ZOSYN) 4.5 g vial to attach to  mL bag        Note to Pharmacy: For [facility], [facility] and [facility]: For Zosyn-naive patients, use the \"Zosyn initial dose + extended infusion\" order panel.    4.5 g  over 30 Minutes Intravenous ONCE 03/25/24 0418 03/25/24 0500            4. Is initial hypotension present?     No (IV fluid bolus NOT required). IV Fluid volume administered: 2300                    Severe Sepsis reassessment:  1. Repeat Lactic Acid Level within 6 hours of time zero: pending  2. MAP>65 after initial IVF bolus, will continue to monitor fluid status and vital signs    I attest to having performed a repeat sepsis exam and assessment of perfusion at 0500 and the results demonstrate improved perfusion.    Medical Decision Making:  Jessica Villa is a 43 year old female who arrives with sudden onset severe abdominal pain with vomiting.  She is afebrile without other infectious symptoms.  A CT scan in addition to laboratory studies were obtained.  This shows that the patient has mechanical small bowel obstruction with some mesenteric edema.  A lactic acid was drawn.  This is slightly elevated.  This was after 1 L of IV fluids.  I think sepsis is low on the differential but given the possibility, we are " treating her with additional fluid resuscitation and antibiotics.  I suspect this is more likely due to dehydration from vomiting as well as possible tissue ischemia.  General surgery was consulted.  They recommended we place an NG.  This was done.  They will evaluate the patient for possible surgery.  Fortunately, she is clinically improved with decreased pain and no recurrent vomiting.  She will be admitted to the hospital under the care of Dr. Jarquin.    Diagnosis:    ICD-10-CM    1. Small bowel obstruction (H)  K56.609            Discharge Medications:  New Prescriptions    No medications on file          Scribe Disclosure:  RANDAL, Joan Fontana, am serving as a scribe at 1:33 AM on 3/25/2024 to document services personally performed by Marsha Sanchez MD based on my observations and the provider's statements to me.   3/25/2024   Marsha Sanchez MD Cho, Amy C, MD  03/25/24 0501

## 2024-03-25 NOTE — ED NOTES
[facility]  ED Nurse Handoff Report    ED Chief complaint: Abdominal Pain      ED Diagnosis:   Final diagnoses:   Small bowel obstruction (H)       Code Status: Admitting MD to discuss    Allergies: No Known Allergies    Patient Story: Pt arrives via triage presenting with upper abdominal pain that began today.    Focused Assessment:  A & Ox4. Pt denies nausea or vomiting. NG Placed in ED    Labs Ordered and Resulted from Time of ED Arrival to Time of ED Departure   COMPREHENSIVE METABOLIC PANEL - Abnormal       Result Value    Sodium 139      Potassium 3.5      Carbon Dioxide (CO2) 24      Anion Gap 12      Urea Nitrogen 15.1      Creatinine 0.38 (*)     GFR Estimate >90      Calcium 8.6      Chloride 103      Glucose 262 (*)     Alkaline Phosphatase 85      AST 48 (*)      (*)     Protein Total 6.6      Albumin 3.8      Bilirubin Total 0.3     CBC WITH PLATELETS AND DIFFERENTIAL - Abnormal    WBC Count 12.1 (*)     RBC Count 4.98      Hemoglobin 15.5      Hematocrit 44.4      MCV 89      MCH 31.1      MCHC 34.9      RDW 12.0      Platelet Count 238      % Neutrophils 69      % Lymphocytes 26      % Monocytes 4      % Eosinophils 1      % Basophils 0      % Immature Granulocytes 0      NRBCs per 100 WBC 0      Absolute Neutrophils 8.2      Absolute Lymphocytes 3.1      Absolute Monocytes 0.5      Absolute Eosinophils 0.2      Absolute Basophils 0.1      Absolute Immature Granulocytes 0.0      Absolute NRBCs 0.0     ISTAT GASES LACTATE VENOUS POCT - Abnormal    Lactic Acid POCT 2.3 (*)     Bicarbonate Venous POCT 25      O2 Sat, Venous POCT 85 (*)     pCO2 Venous POCT 47      pH Venous POCT 7.33      pO2 Venous POCT 54 (*)     Base Excess/Deficit (+/-) POCT -2.0     LIPASE - Normal    Lipase 44     HCG QUALITATIVE PREGNANCY - Normal    hCG Serum Qualitative Negative     BLOOD CULTURE   BLOOD CULTURE     CT Abdomen Pelvis w Contrast   Final Result   IMPRESSION:    1.  Mechanical mid small  bowel obstruction. No free air.      2.  Hepatic steatosis.            To be done/followed up on inpatient unit:  Admitting MD Orders    Does this patient have any cognitive concerns?:  none    Activity level - Baseline/Home:  Independent  Activity Level - Current:   Independent    Patient's Preferred language: Scottish   Needed?: No    Isolation: None  Infection: Not Applicable  Patient tested for COVID 19 prior to admission: NO  Bariatric?: No    Vital Signs:   Vitals:    03/25/24 0218 03/25/24 0231 03/25/24 0301 03/25/24 0430   BP:  107/60  113/70   Pulse:    72   Resp:       Temp:       TempSrc:       SpO2: 98% 99% 96% 99%   Weight:           Cardiac Rhythm:Cardiac Rhythm: Normal sinus rhythm    Was the PSS-3 completed:   Yes  What interventions are required if any?               Family Comments: Daughter present  OBS brochure/video discussed/provided to patient/family: No             For the majority of the shift this patient's behavior was Green.   Behavioral interventions performed were frequent rounding.    ED NURSE PHONE NUMBER: *[phone]

## 2024-03-26 VITALS
OXYGEN SATURATION: 98 % | WEIGHT: 158.51 LBS | BODY MASS INDEX: 28.09 KG/M2 | TEMPERATURE: 98.6 F | RESPIRATION RATE: 18 BRPM | DIASTOLIC BLOOD PRESSURE: 85 MMHG | SYSTOLIC BLOOD PRESSURE: 112 MMHG | HEART RATE: 59 BPM | HEIGHT: 63 IN

## 2024-03-26 LAB
ALBUMIN SERPL BCG-MCNC: 3.6 G/DL (ref 3.5–5.2)
ALP SERPL-CCNC: 86 U/L (ref 40–150)
ALT SERPL W P-5'-P-CCNC: 138 U/L (ref 0–50)
ANION GAP SERPL CALCULATED.3IONS-SCNC: 9 MMOL/L (ref 7–15)
AST SERPL W P-5'-P-CCNC: 79 U/L (ref 0–45)
BILIRUB SERPL-MCNC: 0.5 MG/DL
BUN SERPL-MCNC: 4.9 MG/DL (ref 6–20)
CALCIUM SERPL-MCNC: 8.9 MG/DL (ref 8.6–10)
CHLORIDE SERPL-SCNC: 103 MMOL/L (ref 98–107)
CREAT SERPL-MCNC: 0.34 MG/DL (ref 0.51–0.95)
DEPRECATED HCO3 PLAS-SCNC: 24 MMOL/L (ref 22–29)
EGFRCR SERPLBLD CKD-EPI 2021: >90 ML/MIN/1.73M2
ERYTHROCYTE [DISTWIDTH] IN BLOOD BY AUTOMATED COUNT: 12.3 % (ref 10–15)
GLUCOSE BLDC GLUCOMTR-MCNC: 141 MG/DL (ref 70–99)
GLUCOSE BLDC GLUCOMTR-MCNC: 147 MG/DL (ref 70–99)
GLUCOSE BLDC GLUCOMTR-MCNC: 152 MG/DL (ref 70–99)
GLUCOSE SERPL-MCNC: 155 MG/DL (ref 70–99)
HCT VFR BLD AUTO: 35.1 % (ref 35–47)
HGB BLD-MCNC: 12.4 G/DL (ref 11.7–15.7)
MCH RBC QN AUTO: 31.7 PG (ref 26.5–33)
MCHC RBC AUTO-ENTMCNC: 35.3 G/DL (ref 31.5–36.5)
MCV RBC AUTO: 90 FL (ref 78–100)
PLATELET # BLD AUTO: 177 10E3/UL (ref 150–450)
POTASSIUM SERPL-SCNC: 3.7 MMOL/L (ref 3.4–5.3)
PROT SERPL-MCNC: 6.5 G/DL (ref 6.4–8.3)
RBC # BLD AUTO: 3.91 10E6/UL (ref 3.8–5.2)
SODIUM SERPL-SCNC: 136 MMOL/L (ref 135–145)
WBC # BLD AUTO: 5 10E3/UL (ref 4–11)

## 2024-03-26 PROCEDURE — 80053 COMPREHEN METABOLIC PANEL: CPT | Performed by: INTERNAL MEDICINE

## 2024-03-26 PROCEDURE — 36415 COLL VENOUS BLD VENIPUNCTURE: CPT | Performed by: INTERNAL MEDICINE

## 2024-03-26 PROCEDURE — 99231 SBSQ HOSP IP/OBS SF/LOW 25: CPT | Performed by: SPECIALIST

## 2024-03-26 PROCEDURE — 99239 HOSP IP/OBS DSCHRG MGMT >30: CPT | Performed by: INTERNAL MEDICINE

## 2024-03-26 PROCEDURE — 85027 COMPLETE CBC AUTOMATED: CPT | Performed by: INTERNAL MEDICINE

## 2024-03-26 RX ORDER — ACETAMINOPHEN 325 MG/1
650 TABLET ORAL EVERY 4 HOURS PRN
COMMUNITY
Start: 2024-03-26

## 2024-03-26 RX ORDER — OMEGA-3 FATTY ACIDS/FISH OIL 300-1000MG
600 CAPSULE ORAL EVERY 8 HOURS PRN
COMMUNITY
Start: 2024-03-26

## 2024-03-26 RX ORDER — METFORMIN HCL 500 MG
TABLET, EXTENDED RELEASE 24 HR ORAL
Qty: 120 TABLET | Refills: 0 | Status: SHIPPED | OUTPATIENT
Start: 2024-03-26 | End: 2024-03-26

## 2024-03-26 RX ORDER — METFORMIN HCL 500 MG
TABLET, EXTENDED RELEASE 24 HR ORAL
Qty: 120 TABLET | Refills: 0 | Status: SHIPPED | OUTPATIENT
Start: 2024-03-26

## 2024-03-26 RX ADMIN — INSULIN GLARGINE 10 UNITS: 100 INJECTION, SOLUTION SUBCUTANEOUS at 08:55

## 2024-03-26 RX ADMIN — INSULIN ASPART 1 UNITS: 100 INJECTION, SOLUTION INTRAVENOUS; SUBCUTANEOUS at 12:54

## 2024-03-26 RX ADMIN — INSULIN ASPART 1 UNITS: 100 INJECTION, SOLUTION INTRAVENOUS; SUBCUTANEOUS at 08:54

## 2024-03-26 RX ADMIN — INSULIN ASPART 1 UNITS: 100 INJECTION, SOLUTION INTRAVENOUS; SUBCUTANEOUS at 00:53

## 2024-03-26 RX ADMIN — INSULIN ASPART 1 UNITS: 100 INJECTION, SOLUTION INTRAVENOUS; SUBCUTANEOUS at 04:36

## 2024-03-26 ASSESSMENT — ACTIVITIES OF DAILY LIVING (ADL)
ADLS_ACUITY_SCORE: 20

## 2024-03-26 NOTE — CONSULTS
NUTRITION EDUCATION      REASON FOR ASSESSMENT:  Reason for Consult: Provider Order - specify Reason    Reason for Consult: diabetic diet indefinitley        NUTRITION HISTORY:  Information obtained from chart review    New dx of T2DM this admission  Lab Results   Component Value Date    A1C 10.8 03/25/2024     CURRENT DIET:  Moderate Consistent Carb (60 g CHO per Meal) Diet      NUTRITION DIAGNOSIS:  Food- and nutrition-related knowledge deficit R/t new dx of T2DM    INTERVENTIONS:  Nutrition Prescription:  Consistent CHO diet    Implementation:      *  Nutrition Education (Content): completed w/    A)  Provided handout: Consistent CHO Counting for Diabetes (in Rwandan)    B)  Discussed consistent CHO diet, impact of CHO vs protein/fiber/fat on BGM. Discussed goal of 60 g of CHO per meal.       *  Nutrition Education (Application):   A)  Discussed current eating habits and recommended alternative food choices      *  Anticipate good compliance      *  Diet Education - refer to Education Flowsheet    Goals:      *  Patient will verbalize understanding of diet by pt verbalized understanding, declined questions      *  All of the above goals met during the education session    Follow Up/Monitoring:      *  Provided RD contact information for future questions      *  Recommended Out-Patient Nutrition Referral, if further diet instructions are needed      Maryann Dickerson, RD, LD

## 2024-03-26 NOTE — PLAN OF CARE
Shift: 0700-discharge    Orientation: AOX4, utilizing [employer] for    Vital Signs: VSS, RA  Labs: Glucose required insulin  Pain Management: Denies   Bowel: BS audible. Denies N/V.  Bladder: Voiding  Diet: Advanced to mod carb diet. Tolerated well.   Activity Level: Ind    Patient discharged home this afternoon.  Discussed AVS with patient. All questions and concerns related to discharge were addressed. All patient belongings, Rx and AVS sent home with patient.

## 2024-03-26 NOTE — PROGRESS NOTES
VSS. A/O. NG removed, tolerating some clears. Intermittent some abdo pain, walking. Mulitple small watery stools. Speaks Turkmen, able to communicate in English, family @ bedside.

## 2024-03-26 NOTE — DISCHARGE SUMMARY
"[facility]  Hospitalist Discharge Summary      Date of Admission:  3/25/2024  Date of Discharge:  3/26/2024  Discharging Provider: Christy Irby MD  Discharge Service: Hospitalist Service    Discharge Diagnoses   Small bowel obstruction vs Gastroenteritis  Elevated lactic acid, without acidosis, secondary to above, RESOLVED  Leukocytosis, secondary to above  Hepatic steatosis, obesity   Hyperglycemia, DMT2 A1c 10.8     Clinically Significant Risk Factors     # DMII: A1C = 10.8 % (Ref range: <5.7 %) within past 6 months  # Overweight: Estimated body mass index is 28.08 kg/m  as calculated from the following:    Height as of this encounter: 1.6 m (5' 3\").    Weight as of this encounter: 71.9 kg (158 lb 8.2 oz).       Follow-ups Needed After Discharge   Follow-up Appointments     Follow-up and recommended labs and tests       Follow up with [facility], in 1 to 3 weeks for hospital follow up   and to evaluation of your diabetes and elevated liver enzymes. You should   have another A1c in 2 to 3 months and follow up liver enzymes.            Unresulted Labs Ordered in the Past 30 Days of this Admission       Date and Time Order Name Status Description    3/25/2024  4:19 AM Blood Culture Peripheral Blood Preliminary     3/25/2024  4:19 AM Blood Culture Peripheral Blood Preliminary             Discharge Disposition   Discharged to home  Condition at discharge: Good    Hospital Course   Jessica Villa is a 43 year old female with no significant past medical history who presents with abdominal pain, n/v/d. Admitted on 3/25/2024.      Small bowel obstruction vs Gastroenteritis Quick Recovery  Elevated lactic acid, without acidosis, secondary to above, RESOLVED  Leukocytosis, secondary to above  *Presented with diarrhea initially, followed by lower abdominal pain, nausea and vomiting.   *CT abd/pelvis with mechanical mid small bowel obstruction and lower mesenteric edema, lactic acid " 2.3, WBC 12.1.   *No prior hx of abdominal surgeries  *Case discussed with on call surgeon in ED, recommended NG tube and will evaluate patient  *Abdominal exam non-surgical at this time.     General surgery consult appreciated.   NG removed advanced to clear liquids on HD#1 and continued to tolerate diet advancing to regular diet small frequent meals at day of discharge. Given quick improvement, diarrhea, ? If this represented severe gastroenteritis rather than SBO.   Lactic acid normalized the first day of her stay.       Hepatic steatosis, obesity   *AST 48, . CT with hepatic steatosis.  *Denies alcohol use.  *hepatic steatosis, normal gallbladder on CT. No RUQ pain.   Discussed weight loss and exercise (and I added a written note regarding this to her discharge paperwork to share with her primary provider.)    Recent Labs   Lab 03/26/24  0648 03/25/24  0236 03/25/24  0146   ALBUMIN 3.6 3.8  --    BILITOTAL 0.5 0.3  --    * 123*  --    AST 79* 48*  --    ALKPHOS 86 85  --    LIPASE  --   --  44   CR 0.34* 0.38*  --             Hyperglycemia, DMT2 A1c 10.8   Glucose 262 on CMP. No known hx of diabetes  Given lantus 10 units daily during her stay.   Discussed with patient, aware of diagnosis. On no medications. Has glucometer at home.   Nutrition consult  Prescribed metformin at discharge. Discussed potential indigestion and diarrhea with this medication, to start at 500 mg and titrate up slowly. Recommend she start this after her bowel function had returned to normal.      Recent Labs   Lab 03/26/24  1242 03/26/24  0648 03/26/24  0419 03/26/24  0018 03/25/24  2107 03/25/24  1652   * 155* 147* 152* 127* 129*                      Consultations This Hospital Stay   SURGERY GENERAL IP CONSULT  NUTRITION SERVICES ADULT IP CONSULT  NUTRITION SERVICES ADULT IP CONSULT  NUTRITION SERVICES ADULT IP CONSULT    Code Status   Full Code    Time Spent on this Encounter   IChristy MD, personally  saw the patient today and spent greater than 30 minutes discharging this patient.       Chrsity Irby MD  [facility] SURGERY  [address] [place] [address]  Phone: [phone]  Fax: [phone]  ______________________________________________________________________    Physical Exam   Vital Signs: Temp: 98.6  F (37  C) Temp src: Oral BP: 112/85 Pulse: 59   Resp: 18 SpO2: 98 % O2 Device: None (Room air)    Weight: 158 lbs 8.17 oz  Constitutional:  NAD,   Neuropsyche:  alert and oriented, answers questions appropriately. Speech normal, face symmetric and moving all 4 extremities without gross focal neurological deficit.   Respiratory:  Breathing comfortably, good air exchange, no wheezes, no crackles.   Cardiovascular:  Regular rate and rhythm, no edema.  GI:  soft, NT/ND, BS normal  Skin/Integumen:  No acute rash or sign of bleeding.          Primary Care Physician   Physician No Ref-Primary    Discharge Orders      Activity    Your activity upon discharge: activity as tolerated     Follow-up and recommended labs and tests     Follow up with [facility], in 1 to 3 weeks for hospital follow up and to evaluation of your diabetes and elevated liver enzymes. You should have another A1c in 2 to 3 months and follow up liver enzymes.     Reason for your hospital stay    You were admitted with nausea and vomiting and diarrhea. Your CT showed possible obstruction but symptoms resolved quickly without surgery.   We tested your diabetes with a test called A1c and it was elevated at 10.8 consistent with an average blood sugar of about 250. Our goal is to get your average blood sugar under 150.   When you have recovered from your current illness, start metformin at 500 mg with supper and increase by 500 mg each week, until you are taking 1000 mg with breakfast and 1000 mg with supper.    I recommend a diet low in carbohydrates and regular exercise. Avoid sugary drinks.  If you get  sick an illness where you are not eating or drinking much, hold metformin.  Your liver enzymes were also mildly elevated and you had increased fat in your liver. This is treated the same way we treat diabetes. Follow a diet low in carbohydrates, exercise, and weight loss.     Diet    Follow this diet upon discharge: Orders Placed This Encounter      Moderate Consistent Carb (60 g CHO per Meal) Diet, small frequent diets.       Significant Results and Procedures   Most Recent 3 CBC's:  Recent Labs   Lab Test 03/26/24  0648 03/25/24  0146   WBC 5.0 12.1*   HGB 12.4 15.5   MCV 90 89    238     Most Recent 3 BMP's:  Recent Labs   Lab Test 03/26/24  1242 03/26/24  0648 03/26/24  0419 03/25/24  0908 03/25/24  0236   NA  --  136  --   --  139   POTASSIUM  --  3.7  --   --  3.5   CHLORIDE  --  103  --   --  103   CO2  --  24  --   --  24   BUN  --  4.9*  --   --  15.1   CR  --  0.34*  --   --  0.38*   ANIONGAP  --  9  --   --  12   LINETTE  --  8.9  --   --  8.6   * 155* 147*   < > 262*    < > = values in this interval not displayed.     Most Recent 2 LFT's:  Recent Labs   Lab Test 03/26/24  0648 03/25/24  0236   AST 79* 48*   * 123*   ALKPHOS 86 85   BILITOTAL 0.5 0.3     Most Recent Hemoglobin A1c:  Recent Labs   Lab Test 03/25/24  0146   A1C 10.8*   ,   Results for orders placed or performed during the hospital encounter of 03/25/24   CT Abdomen Pelvis w Contrast    Narrative    EXAM: CT ABDOMEN PELVIS W CONTRAST  LOCATION: [facility]  DATE: 3/25/2024    INDICATION: periumbilical pain, vomiting  COMPARISON: None.  TECHNIQUE: CT scan of the abdomen and pelvis was performed following injection of IV contrast. Multiplanar reformats were obtained. Dose reduction techniques were used.  CONTRAST: 85mL Isovue 370    FINDINGS:   LOWER CHEST: Normal.    HEPATOBILIARY: Diffuse hepatic steatosis. Normal gallbladder.    PANCREAS: Normal.    SPLEEN: Normal.    ADRENAL GLANDS:  Normal.    KIDNEYS/BLADDER: Normal kidneys. The bladder is empty.    BOWEL: Multiple loops of fluid-filled and mildly distended mid small bowel with areas of fecalization of internal content in the central abdomen. Multiple bowel caliber narrowings in the deep lower abdomen as seen on axial image 138 and coronal image 44.   Lower mesenteric edema. No free air. Normal appendix.    LYMPH NODES: Normal.    VASCULATURE: Normal.    PELVIC ORGANS: Free fluid. No mass.    MUSCULOSKELETAL: Normal.      Impression    IMPRESSION:   1.  Mechanical mid small bowel obstruction. No free air.    2.  Hepatic steatosis.   XR Abdomen 1 View    Narrative    EXAM: XR ABDOMEN 1 VIEW  LOCATION: [facility]  DATE: 3/25/2024    INDICATION: NG tube placement  COMPARISON: CT 03/25/2024      Impression    IMPRESSION: NG tube tip in the stomach.       Discharge Medications   Current Discharge Medication List        START taking these medications    Details   acetaminophen (TYLENOL) 325 MG tablet Take 2 tablets (650 mg) by mouth every 4 hours as needed for pain (Max dose of 3000 mg per day.)    Associated Diagnoses: Small bowel obstruction (H)      metFORMIN (GLUCOPHAGE XR) 500 MG 24 hr tablet Start 500 mg by mouth before supper x 1 week, then increase by  500 mg weekly, until taking 1000 mg with breakfast and 1000 mg before supper.  Qty: 120 tablet, Refills: 0    Associated Diagnoses: Type 2 diabetes mellitus with hyperglycemia, without long-term current use of insulin (H)           CONTINUE these medications which have CHANGED    Details   ibuprofen (ADVIL) 200 MG capsule Take 3 capsules (600 mg) by mouth every 8 hours as needed for moderate pain    Associated Diagnoses: Post-streptococcal reactive arthritis (H)           Allergies   No Known Allergies

## 2024-03-26 NOTE — PLAN OF CARE
Date & Time: 3/26/2024  1210-3426  Surgery/POD#: SBO  Behavior & Aggression: Green  Fall Risk:  No  Orientation:AOx4  ABNL VS/O2: VSS on RA  ABNL Labs:  Pain Management: PRN tylenol, Oxycodone  Bowel/Bladder: No BM this shift. Voiding in bathroom  Drains: N/A  Diet: Clear liquid diet  Activity Level: Ind  Tests/Procedures: N/A  Anticipated  DC Date: Pending  Significant Information: Elevated BS slightly overnight. Possible discharge on 3/26/2024. Family at bedside

## 2024-03-26 NOTE — PROGRESS NOTES
No complaints.  No pain.  Tolerating clear liquids well.  Afebrile, vital signs stable.    Physical exam:  Looks well.  Abdomen is soft and nontender.    Impression: Likely severe gastroenteritis that is resolving.  She can be discharged home from our standpoint.  I told her to stick with small meals and lots of liquids for couple days and then can resume a regular diet.  No follow-up with surgery needed.

## 2024-03-30 LAB
BACTERIA BLD CULT: NO GROWTH
BACTERIA BLD CULT: NO GROWTH